# Patient Record
Sex: FEMALE | Race: OTHER | HISPANIC OR LATINO | URBAN - METROPOLITAN AREA
[De-identification: names, ages, dates, MRNs, and addresses within clinical notes are randomized per-mention and may not be internally consistent; named-entity substitution may affect disease eponyms.]

---

## 2024-07-10 ENCOUNTER — OFFICE VISIT (OUTPATIENT)
Dept: URBAN - METROPOLITAN AREA CLINIC 60 | Facility: CLINIC | Age: 54
End: 2024-07-10
Payer: COMMERCIAL

## 2024-07-10 ENCOUNTER — DASHBOARD ENCOUNTERS (OUTPATIENT)
Age: 54
End: 2024-07-10

## 2024-07-10 VITALS
HEIGHT: 61 IN | BODY MASS INDEX: 29.11 KG/M2 | TEMPERATURE: 98.8 F | DIASTOLIC BLOOD PRESSURE: 70 MMHG | RESPIRATION RATE: 12 BRPM | HEART RATE: 76 BPM | WEIGHT: 154.2 LBS | SYSTOLIC BLOOD PRESSURE: 118 MMHG | OXYGEN SATURATION: 99 %

## 2024-07-10 DIAGNOSIS — Z87.19 HISTORY OF DIVERTICULITIS: ICD-10-CM

## 2024-07-10 DIAGNOSIS — R19.7 ACUTE DIARRHEA: ICD-10-CM

## 2024-07-10 DIAGNOSIS — R14.0 ABDOMINAL BLOATING: ICD-10-CM

## 2024-07-10 DIAGNOSIS — R11.0 NAUSEA: ICD-10-CM

## 2024-07-10 PROCEDURE — 99204 OFFICE O/P NEW MOD 45 MIN: CPT

## 2024-07-10 RX ORDER — LEVOTHYROXINE SODIUM 0.11 MG/1
TAKE 1 TABLET BY MOUTH DAILY TABLET ORAL
Qty: 90 EACH | Refills: 1 | Status: ACTIVE | COMMUNITY

## 2024-07-10 NOTE — PHYSICAL EXAM CONSTITUTIONAL:
After Visit Summary   2018    Shahana Donaldson    MRN: [id number]           Patient Information     Date Of Birth          1940        Visit Information        Provider Department      2018 7:05 AM Jeramie Curran MD [facility]        Today's Diagnoses     Abnormal bone density screening    -  1    Benign essential hypertension        Personal history of healed osteoporosis fracture         Vitamin D deficiency           Care Instructions    Primary Care Center Medication Refill Request Information:  * Please contact your pharmacy regarding ANY request for medication refills.  ** [facility] Prescription Fax = [phone]  * Please allow 3 business days for routine medication refills.  * Please allow 5 business days for controlled substance medication refills.     Primary Care Center Test Result notification information:  *You will be notified with in 7-10 days of your appointment day regarding the results of your test.  If you are on MyChart you will be notified as soon as the provider has reviewed the results and signed off on them.    Primary Care Center: [phone]     Imaging [phone] [facility]  [phone] [facility]  [phone] [facility]    Please go to the lab on the first floor before you leave today.     DEXA Screening Tool    Dexa Scan  Is the patient taking any calcium supplements? yes, if yes patient must stop calcium supplements 24 hours prior to appointment.          Follow-ups after your visit        Your next 10 appointments already scheduled     2018  7:45 AM CST   LAB with  LAB    Health Lab ([facility])    [address] [place] [address]  1st Floor  [facility] [address] [phone]           Please do not eat 10-12 hours before your appointment if you are coming in fasting for labs on lipids, cholesterol, or glucose (sugar). This does not apply to pregnant  well developed, well nourished , in no acute distress , ambulating without difficulty , normal communication ability women. Water, hot tea and black coffee (with nothing added) are okay. Do not drink other fluids, diet soda or chew gum.            Nov 21, 2018 10:00 AM CST   MA SCREENING DIGITAL BILATERAL with [id number]   [facility] ([facility])    [address], 2nd Floor  [facility] [id number] [phone]           How do I prepare for my exam? (Food and drink instructions) No Food and Drink Restrictions.  How do I prepare for my exam? (Other instructions) Do not use any powder, lotion or deodorant under your arms or on your breast. If you do, we will ask you to remove it before your exam.  What should I wear: Wear comfortable, two-piece clothing.  How long does the exam take: Most scans will take 15 minutes.  What should I bring: Bring any previous mammograms from other facilities or have them mailed to the breast center.  Do I need a :  No  is needed.  What do I need to tell my doctor: If you have any allergies, tell your care team.  What should I do after the exam: No restrictions, You may resume normal activities.  What is this test: This test is an x-ray of the breast to look for breast disease. The breast is pressed between two plates to flatten and spread the tissue. An X-ray is taken of the breast from different angles.  Who should I call with questions: If you have any questions, please call the Imaging Department where you will have your exam. Directions, parking instructions, and other information is available on our website, [email or web address].  Other information about my exam Three-dimensional (3D) mammograms are available at [facility] locations in [facility], [facility], [facility], [place], [facility] and [place]. [facility] locations include [place] and the [facility] in [place].  Benefits of 3D mammograms include * Improved rate of cancer detection * Decreases your chance of having to go back for  "more tests, which means fewer: * \"False-positive\" results (This means that there is an abnormal area but it isn't cancer.) * Invasive testing procedures, such as a biopsy or surgery * Can provide clearer images of the breast if you have dense breast tissue.  *3D mammography is an optional exam that anyone can have with a 2D mammogram. It doesn't replace or take the place of a 2D mammogram. 2D mammograms remain an effective screening test for all women.  Not all insurance companies cover the cost of a 3D mammogram. Check with your insurance. Three-dimensional (3D) mammograms are available at [facility] locations in [facility], [facility][1], [facility], and [place]. [facility] locations include [place] and [facility] in [place]. Benefits of 3D mammograms include: - Improved rate of cancer detection - Decreases your chance of having to go back for more tests, which means fewer: - \"False-positive\" results (This means that there is an abnormal area but it isn't cancer.) - Invasive testing procedures, such as a biopsy or surgery - Can provide clearer images of the breast if you have dense breast tissue. 3D mammography is an optional exam that anyone can have with a 2D mammogram. It doesn't replace or take the place of a 2D mammogram. 2D mammograms remain an effective screening test for all women.  Not all insurance companies cover the cost of a 3D mammogram. Check with your insurance.            Dec 05, 2018  7:30 AM CST   (Arrive by 7:15 AM)   Return Visit with Araceli Gutierrez MD   [facility] ([facility])    [address] [place] [address]  4th Floor  [facility] [id number] [phone]            Dec 05, 2018  8:30 AM CST   DX HIP/PELVIS/SPINE with UCDX1   [facility] [place] ([facility])    [address] [place] [address]  [address] [facility] [id number] [phone]           How do I prepare " for my exam? (Food and drink instructions) No Food and Drink Restrictions.  How do I prepare for my exam? (Other instructions) Please do not take any of the following 24 hours prior to the day of your exam: vitamins, calcium tablets, antacids.  What should I wear: If possible, please wear clothes without metal (snaps, zippers). A sweat suit works well.  How long does the exam take: The exam takes about 20 minutes.  What should I bring: Bring a list of your current medicines to your exam (including vitamins, minerals and over-the-counter drugs).  Do I need a :  No  is needed.  What should I do after the exam: No restrictions, You may resume normal activities.  How do I prepare for my exam? (Food and drink instructions) A DEXA scan is a bone-density scan. It uses a low level of radiation to check the strength of your bones. As you lie on a padded table, a machine will take X-rays. We most often scan the hips and lower spine.  Who should I call with questions: If you have any questions, please call the Imaging Department where you will have your exam. Directions, parking instructions, and other information is available on our website, [email or web address].            May 30, 2019  8:00 AM CDT   RETURN GENERAL with Josue Trujillo MD   [facility] ([facility])    [address] [address] [phone]              Future tests that were ordered for you today     Open Future Orders        Priority Expected Expires Ordered    Dexa hip/pelvis/spine* Routine  11/21/2019 11/21/2018    CBC with platelets differential Routine 11/21/2018 12/5/2018 11/21/2018    Vitamin D Deficiency Routine 11/21/2018 11/21/2019 11/21/2018    Lipid panel reflex to direct LDL Fasting Routine  11/21/2019 11/21/2018    Comprehensive metabolic panel Routine 11/21/2018 12/5/2018 11/21/2018            Who to contact     Please call your clinic at [phone]  to:    Ask questions about your health    Make or cancel appointments    Discuss your medicines    Learn about your test results    Speak to your doctor            Additional Information About Your Visit        [employer] Information     [facility] gives you secure access to your electronic health record. If you see a primary care provider, you can also send messages to your care team and make appointments. If you have questions, please call your primary care clinic.  If you do not have a primary care provider, please call [phone] and they will assist you.      [facility] is an electronic gateway that provides easy, online access to your medical records. With [facility], you can request a clinic appointment, read your test results, renew a prescription or communicate with your care team.     To access your existing account, please contact your [facility] or call [phone] for assistance.        Care EveryWhere ID     This is your Care EveryWhere ID. This could be used by other organizations to access your [place] medical records  [id number]        Your Vitals Were     Pulse Respirations BMI (Body Mass Index)             63 12 21.86 kg/m2          Blood Pressure from Last 3 Encounters:   11/21/18 144/77   10/26/18 132/77   08/07/18 (!) 165/91    Weight from Last 3 Encounters:   11/21/18 67.1 kg (148 lb)   10/25/18 69.1 kg (152 lb 6.4 oz)   08/07/18 69.4 kg (153 lb)                 Today's Medication Changes          These changes are accurate as of 11/21/18  7:28 AM.  If you have any questions, ask your nurse or doctor.               These medicines have changed or have updated prescriptions.        Dose/Directions    clobetasol 0.05 % ointment   Commonly known as:  TEMOVATE   This may have changed:  additional instructions   Used for:  Lichen sclerosus        Apply sparingly to affected area 2 x daily for14 days. Then, apply small amount to affected area twice weekly for maintenance.    Quantity:  30 g   Refills:  2                Primary Care Provider Office Phone # Fax #    Jeramie Curran [phone] [address] [address]        Equal Access to Services     ERNESTO NEFF : Hadii chantelle jo fredao Soomaali, waaxda luqadaha, qaybta kaalmada adeegyada, celso amadorn juan russell ion crane. So [facility] [phone].    ATENCIÓN: Si habla español, tiene a quiroz disposición servicios gratuitos de asistencia lingüística. Llame al [phone].    We comply with applicable federal civil rights laws and [place] laws. We do not discriminate on the basis of race, color, national origin, age, disability, sex, sexual orientation, or gender identity.            Thank you!     Thank you for choosing [facility]  for your care. Our goal is always to provide you with excellent care. Hearing back from our patients is one way we can continue to improve our services. Please take a few minutes to complete the written survey that you may receive in the mail after your visit with us. Thank you!             Your Updated Medication List - Protect others around you: Learn how to safely use, store and throw away your medicines at www.disposemymeds.org.          This list is accurate as of 11/21/18  7:28 AM.  Always use your most recent med list.                   Brand Name Dispense Instructions for use Diagnosis    ascorbic acid 500 MG tablet    VITAMIN C     Take 500 mg by mouth as needed (when thinking of it)        aspirin 325 MG EC tablet     42 tablet    Take 1 tablet (325 mg) by mouth daily    Closed fracture of left hip, initial encounter (H)       atorvastatin 10 MG tablet    LIPITOR    90 tablet    Take 1 tablet (10 mg) by mouth daily    Mixed hyperlipidemia       calcium carbonate 500 mg (elemental) 500 MG tablet    OSCAL;OYSTER SHELL CALCIUM    60 tablet    Take 3 tablets (1,500 mg) by mouth 2 times daily (with meals)    Closed fracture of left  hip, initial encounter (H)       cholecalciferol 1000 UNIT tablet    vitamin D3    30 tablet    Take 2 tablets (2,000 Units) by mouth daily    Closed fracture of left hip, initial encounter (H)       clobetasol 0.05 % ointment    TEMOVATE    30 g    Apply sparingly to affected area 2 x daily for14 days. Then, apply small amount to affected area twice weekly for maintenance.    Lichen sclerosus       diltiazem 300 MG 24 hr capsule    CARTIA XT    90 capsule    Take 1 capsule (300 mg) by mouth daily    Benign essential hypertension       docusate sodium 100 MG capsule    COLACE    60 capsule    Take 1 capsule (100 mg) by mouth 2 times daily    Drug-induced constipation       enoxaparin 40 MG/0.4ML injection    LOVENOX    5.6 mL    Inject 0.4 mLs (40 mg) Subcutaneous every 24 hours    Closed fracture of left hip, initial encounter (H)       methocarbamol 750 MG tablet    ROBAXIN    45 tablet    Take 1 tablet (750 mg) by mouth every 6 hours as needed for muscle spasms    Closed fracture of left hip, initial encounter (H), Closed displaced midcervical fracture of left femur, initial encounter (H)       TYLENOL PO      Take 1,000 mg by mouth every 6 hours as needed for mild pain or fever    Abnormal bone density screening, Benign essential hypertension

## 2024-07-10 NOTE — HPI-TODAY'S VISIT:
Patient is a 54-year-old female with past medical history of partial colectomy in 2019 due to recurrent diverticulitis with abscess, GERD, dysphagia and constipation who presents today with complaints of intermittent left lower quadrant abdominal pain, abdominal bloating, nausea while eating and intermittent dysphagia. Patient has had chronic constipation for quite some time and at one point did try Linzess 145 mcg which alleviated her symptoms however insurance denied medication and patient did try Trulance however it gave her abdominal pain and she discontinued medication. She states that she now takes magnesium supplement which helps relieve constipation symptoms. She also has a history of dysphagia and epigastric discomfort with acid reflux and was prescribed pantoprazole and famotidine. At today's visit patient states that. She is not taking any GI meds at this time. She states that she has changed her diet by avoiding gluten, lactose, raw vegetables and gas producing foods which improved her symptoms for a little while. She states that last month she had a recurrence of diverticulitis and was prescribed antibiotics by her PCP. Now she is experiencing intermittent left lower quadrant discomfort, abdominal bloating after eating and nausea. She denies vomiting, constipation, change in appetite, unintentional weight loss, hematochezia and melena. Her last colonoscopy was in 2021 with a 10-year recall. Patient also states she is not having a recurrence of intermittent dysphagia with dry solid foods like rice, bread and chicken. Her last EGD done in 2021 did show a nonobstructing Schatzki's ring which was dilated.  Patient denies history of stroke, heart attack, pacemaker, defibrillator, stents, blood thinners, COPD, asthma, BYRON, chronic kidney disease and seizures.  . Colonoscopy 4/29/2021 - Nonbleeding internal hemorrhoids - Mild diverticulosis in left colon, no evidence of diverticular bleeding - Patent end-to-end low anterior anastomosis characterized by healthy-appearing mucosa - Repeat colonoscopy in 10 years . EGD 4/29/2021 - Normal esophagus - 3 cm hiatal hernia - Normal stomach - Normal examined duodenum - Nonobstructing Schatzki's ring, dilated - No specimens collected . CT abdomen/pelvis without contrast stone protocol 4/11/2023 - No renal calculi or hydronephrosis visualized - No acute findings seen within the abdomen and pelvis - Normal appendix - Mild to moderate diverticulosis

## 2024-07-17 ENCOUNTER — LAB OUTSIDE AN ENCOUNTER (OUTPATIENT)
Dept: URBAN - METROPOLITAN AREA CLINIC 60 | Facility: CLINIC | Age: 54
End: 2024-07-17

## 2024-08-27 ENCOUNTER — TELEPHONE ENCOUNTER (OUTPATIENT)
Dept: URBAN - METROPOLITAN AREA CLINIC 60 | Facility: CLINIC | Age: 54
End: 2024-08-27

## 2024-10-07 ENCOUNTER — OFFICE VISIT (OUTPATIENT)
Dept: URBAN - METROPOLITAN AREA SURGERY CENTER 4 | Facility: SURGERY CENTER | Age: 54
End: 2024-10-07
Payer: COMMERCIAL

## 2024-10-07 ENCOUNTER — CLAIMS CREATED FROM THE CLAIM WINDOW (OUTPATIENT)
Dept: URBAN - METROPOLITAN AREA CLINIC 4 | Facility: CLINIC | Age: 54
End: 2024-10-07
Payer: COMMERCIAL

## 2024-10-07 DIAGNOSIS — Z98.0 INTESTINAL BYPASS AND ANASTOMOSIS STATUS: ICD-10-CM

## 2024-10-07 DIAGNOSIS — K20.90 ESOPHAGITIS, UNSPECIFIED WITHOUT BLEEDING: ICD-10-CM

## 2024-10-07 DIAGNOSIS — Z86.0100 PERSONAL HISTORY OF COLONIC POLYPS: ICD-10-CM

## 2024-10-07 DIAGNOSIS — K44.9 DIAPHRAGMATIC HERNIA WITHOUT OBSTRUCTION OR GANGRENE: ICD-10-CM

## 2024-10-07 DIAGNOSIS — K64.0 FIRST DEGREE HEMORRHOIDS: ICD-10-CM

## 2024-10-07 DIAGNOSIS — Z09 ENCOUNTER FOR FOLLOW-UP: ICD-10-CM

## 2024-10-07 DIAGNOSIS — K44.9 HIATAL HERNIA: ICD-10-CM

## 2024-10-07 DIAGNOSIS — K57.30 DIVERTICULOSIS OF LARGE INTESTINE WITHOUT PERFORATION OR ABSCESS WITHOUT BLEEDING: ICD-10-CM

## 2024-10-07 DIAGNOSIS — K31.89 OTHER DISEASES OF STOMACH AND DUODENUM: ICD-10-CM

## 2024-10-07 DIAGNOSIS — K20.80 ESOPHAGITIS, LOS ANGELES GRADE A: ICD-10-CM

## 2024-10-07 PROCEDURE — 43239 EGD BIOPSY SINGLE/MULTIPLE: CPT | Performed by: INTERNAL MEDICINE

## 2024-10-07 PROCEDURE — 45378 DIAGNOSTIC COLONOSCOPY: CPT | Performed by: INTERNAL MEDICINE

## 2024-10-07 PROCEDURE — 0529F INTRVL 3/>YR PTS CLNSCP DOCD: CPT | Performed by: INTERNAL MEDICINE

## 2024-10-07 PROCEDURE — 00813 ANES UPR LWR GI NDSC PX: CPT | Performed by: NURSE ANESTHETIST, CERTIFIED REGISTERED

## 2024-10-07 PROCEDURE — 88305 TISSUE EXAM BY PATHOLOGIST: CPT | Performed by: PATHOLOGY

## 2024-10-07 RX ORDER — LEVOTHYROXINE SODIUM 0.11 MG/1
TAKE 1 TABLET BY MOUTH DAILY TABLET ORAL
Qty: 90 EACH | Refills: 1 | Status: ACTIVE | COMMUNITY

## 2024-10-18 ENCOUNTER — TELEPHONE ENCOUNTER (OUTPATIENT)
Dept: URBAN - METROPOLITAN AREA CLINIC 63 | Facility: CLINIC | Age: 54
End: 2024-10-18

## 2024-10-23 ENCOUNTER — OFFICE VISIT (OUTPATIENT)
Dept: URBAN - METROPOLITAN AREA CLINIC 60 | Facility: CLINIC | Age: 54
End: 2024-10-23
Payer: COMMERCIAL

## 2024-10-23 VITALS
HEIGHT: 61 IN | TEMPERATURE: 98.5 F | BODY MASS INDEX: 29 KG/M2 | WEIGHT: 153.6 LBS | DIASTOLIC BLOOD PRESSURE: 68 MMHG | SYSTOLIC BLOOD PRESSURE: 122 MMHG | OXYGEN SATURATION: 98 % | RESPIRATION RATE: 12 BRPM | HEART RATE: 76 BPM

## 2024-10-23 DIAGNOSIS — R14.0 ABDOMINAL BLOATING: ICD-10-CM

## 2024-10-23 DIAGNOSIS — K21.00 GASTROESOPHAGEAL REFLUX DISEASE WITH ESOPHAGITIS WITHOUT HEMORRHAGE: ICD-10-CM

## 2024-10-23 DIAGNOSIS — K59.09 CHRONIC CONSTIPATION: ICD-10-CM

## 2024-10-23 PROBLEM — 266433003: Status: ACTIVE | Noted: 2024-10-23

## 2024-10-23 PROCEDURE — 99214 OFFICE O/P EST MOD 30 MIN: CPT

## 2024-10-23 RX ORDER — LEVOTHYROXINE SODIUM 0.11 MG/1
TAKE 1 TABLET BY MOUTH DAILY TABLET ORAL
Qty: 90 EACH | Refills: 1 | Status: ACTIVE | COMMUNITY

## 2024-10-23 NOTE — HPI-TODAY'S VISIT:
Patient is a 54-year-old female with a past medical history of partial colectomy in 2019 due to recurrent diverticulitis with abscess, GERD and constipation who presents today for EGD and colonoscopy follow-up.  At today's visit patient states she is feeling well and her only GI complaint is some abdominal bloating and excessive gas.  Reviewed EGD and colonoscopy results with patient including 5-year colonoscopy recall.  Discussed with patient starting acid reducing medication in light of her symptoms and EGD results however patient states that she would like to stay away from medication at this time.  Patient denies fever, dysphagia, acid reflux, change in appetite, abdominal pain, nausea, vomiting, diarrhea, constipation, hematemesis, hematochezia, melena, change in stool frequency/caliber, unintentional weight loss/gain. . Colonoscopy 10/7/2024 - Internal hemorrhoids - Diverticulosis in the descending colon - Patent end-to-end colocolonic anastomosis characterized by healthy-appearing mucosa - No specimens collected - Repeat colonoscopy in 5 years . EGD 10/7/2024 - 1 cm hiatal hernia - Normal stomach biopsied - Normal examined duodenum, biopsy - LA grade a reflux esophagitis with no bleeding, biopsy - Pathology: Second part duodenum biopsy-no significant abnormality; stomach antrum/body biopsy-no significant abnormality . Colonoscopy 4/29/2021 - Nonbleeding internal hemorrhoids - Mild diverticulosis in left colon, no evidence of diverticular bleeding - Patent end-to-end low anterior anastomosis characterized by healthy-appearing mucosa - Repeat colonoscopy in 10 years . EGD 4/29/2021 - Normal esophagus - 3 cm hiatal hernia - Normal stomach - Normal examined duodenum - Nonobstructing Schatzki's ring, dilated - No specimens collected . CT abdomen/pelvis without contrast stone protocol 4/11/2023 - No renal calculi or hydronephrosis visualized - No acute findings seen within the abdomen and pelvis - Normal appendix - Mild to moderate diverticulosis . Labs 8/28/2024 - Iron panel within normal limits - Amylase and lipase within normal limits - Lipid panel: Cholesterol 239, HDL 48, triglycerides 261,  - CMP: Glucose 104 otherwise unremarkable - Magnesium within normal limits - TSH within normal limits - CBCD within normal limits - Urinalysis within normal - CHELSI negative